# Patient Record
Sex: MALE | Race: WHITE | NOT HISPANIC OR LATINO | Employment: OTHER | URBAN - METROPOLITAN AREA
[De-identification: names, ages, dates, MRNs, and addresses within clinical notes are randomized per-mention and may not be internally consistent; named-entity substitution may affect disease eponyms.]

---

## 2021-11-16 ENCOUNTER — APPOINTMENT (OUTPATIENT)
Dept: GENERAL RADIOLOGY | Facility: HOSPITAL | Age: 36
End: 2021-11-16

## 2021-11-16 ENCOUNTER — HOSPITAL ENCOUNTER (EMERGENCY)
Facility: HOSPITAL | Age: 36
Discharge: HOME | End: 2021-11-16
Attending: EMERGENCY MEDICINE | Admitting: EMERGENCY MEDICINE

## 2021-11-16 VITALS
HEART RATE: 98 BPM | WEIGHT: 149.03 LBS | DIASTOLIC BLOOD PRESSURE: 78 MMHG | HEIGHT: 67 IN | TEMPERATURE: 97.7 F | BODY MASS INDEX: 23.39 KG/M2 | RESPIRATION RATE: 16 BRPM | SYSTOLIC BLOOD PRESSURE: 128 MMHG | OXYGEN SATURATION: 97 %

## 2021-11-16 DIAGNOSIS — S20.212A CHEST WALL CONTUSION, LEFT, INITIAL ENCOUNTER: Primary | ICD-10-CM

## 2021-11-16 PROCEDURE — 99282 EMERGENCY DEPT VISIT SF MDM: CPT

## 2021-11-16 PROCEDURE — 71101 X-RAY EXAM UNILAT RIBS/CHEST: CPT

## 2021-11-16 RX ORDER — CYCLOBENZAPRINE HCL 10 MG
10 TABLET ORAL 3 TIMES DAILY PRN
Qty: 30 TABLET | Refills: 0 | Status: SHIPPED | OUTPATIENT
Start: 2021-11-16

## 2021-11-16 RX ORDER — IBUPROFEN 600 MG/1
600 TABLET ORAL 3 TIMES DAILY
Qty: 30 TABLET | Refills: 0 | Status: SHIPPED | OUTPATIENT
Start: 2021-11-16

## 2021-11-16 NOTE — DISCHARGE INSTRUCTIONS
Take Ibuprofen and Flexeril for pain/muscle spasms. See your PCP for follow up, Return to ED for new/worsening symptoms.
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
## 2022-11-29 ENCOUNTER — OFFICE VISIT (OUTPATIENT)
Dept: NEUROSURGERY | Facility: CLINIC | Age: 37
End: 2022-11-29

## 2022-11-29 VITALS
BODY MASS INDEX: 24.37 KG/M2 | DIASTOLIC BLOOD PRESSURE: 73 MMHG | SYSTOLIC BLOOD PRESSURE: 123 MMHG | HEIGHT: 67 IN | WEIGHT: 155.3 LBS | HEART RATE: 75 BPM

## 2022-11-29 DIAGNOSIS — M47.816 LUMBAR FACET ARTHROPATHY: ICD-10-CM

## 2022-11-29 DIAGNOSIS — M51.36 DEGENERATIVE DISC DISEASE, LUMBAR: ICD-10-CM

## 2022-11-29 DIAGNOSIS — M54.42 CHRONIC MIDLINE LOW BACK PAIN WITH BILATERAL SCIATICA: Primary | ICD-10-CM

## 2022-11-29 DIAGNOSIS — M54.41 CHRONIC MIDLINE LOW BACK PAIN WITH BILATERAL SCIATICA: Primary | ICD-10-CM

## 2022-11-29 DIAGNOSIS — G89.29 CHRONIC MIDLINE LOW BACK PAIN WITH BILATERAL SCIATICA: Primary | ICD-10-CM

## 2022-11-29 PROCEDURE — 99204 OFFICE O/P NEW MOD 45 MIN: CPT | Performed by: NURSE PRACTITIONER

## 2022-11-29 RX ORDER — MELOXICAM 15 MG/1
TABLET ORAL
COMMUNITY
Start: 2022-10-04

## 2022-11-29 RX ORDER — SODIUM FLUORIDE 5 MG/G
PASTE, DENTIFRICE ORAL
COMMUNITY
Start: 2022-10-11

## 2022-11-29 NOTE — PROGRESS NOTES
"Chief Complaint  Back Pain (Low back pain that radiates bilaterally into calves)    Subjective          Bry Mcwilliams who is a 37 y.o. year old male who presents to [facility] for evaluation of lumbar spine.      The patient complains of pain located in the lumbar spine.  Patients states the pain has been present for several years.  The pain came on gradually.  He herniated two discs in 2012. Worked through the pain as he is active . Pain has progressed with time. Pain is primarily in the low back. He will have flare ups of his pain with certain activities that will cause severe pain that immobilizes him. The pain scale level is 2-4 at baseline. Can be severe at times.  The pain does radiate. Dermatomes are located bilaterally Lumbar at: into the posterior thighs and calves..  This is occasional. The pain is constant and waxing/waning and described as sharp and aching.  The pain is worse at no particular time of day. Patient states bending, twisting and lifting makes the pain worse.  Patient states rest makes the pain better.    Associated Symptoms Include: Numbness and Tingling  Conservative Interventions Include: Physical Therapy that was temporarily effective. Flexeril as needed. Also taking NSAIDs as needed. He has been evaluated by pain management, though has not had any type of injections.    Was this the result of an injury or accident?: No    History of Previous Spinal Surgery?: No    Nicotine use:  smoker  (0.5 ppd x 20 yrs)    BMI: Body mass index is 24.32 kg/m².    He scheduled    Review of Systems   Musculoskeletal: Positive for arthralgias and back pain.   Neurological: Positive for numbness.   All other systems reviewed and are negative.       Objective   Vital Signs:   /73 (BP Location: Right arm, Patient Position: Sitting, Cuff Size: Adult)   Pulse 75   Ht 170.2 cm (67\")   Wt 70.4 kg (155 lb 4.8 oz)   BMI 24.32 kg/m²   "     Physical Exam  Vitals reviewed.   Constitutional:       Appearance: Normal appearance.   Musculoskeletal:      Lumbar back: Tenderness present. Negative right straight leg raise test and negative left straight leg raise test.      Right hip: No tenderness. Normal range of motion.      Left hip: No tenderness. Normal range of motion.   Neurological:      Mental Status: He is alert and oriented to person, place, and time.      Motor: Motor strength is normal.      Gait: Gait is intact.      Deep Tendon Reflexes:      Reflex Scores:       Patellar reflexes are 2+ on the right side and 2+ on the left side.       Achilles reflexes are 2+ on the right side and 2+ on the left side.       Neurologic Exam     Mental Status   Oriented to person, place, and time.   Level of consciousness: alert    Motor Exam   Muscle bulk: normal  Overall muscle tone: normal    Strength   Strength 5/5 throughout.     Sensory Exam   Light touch normal.     Gait, Coordination, and Reflexes     Gait  Gait: normal    Reflexes   Right patellar: 2+  Left patellar: 2+  Right achilles: 2+  Left achilles: 2+  Right ankle clonus: absent  Left ankle clonus: absent       Result Review :       Data reviewed: Radiologic studies MRI Lumbar Spine at [facility] on 11/10/22 personally reviewed. Lower lumbar spine degenerative disc disease, most significant at L5/S1 where there is an assymetric disc protrusion resulting in mild right lateral recess and moderately severe foraminal stenosis with possible impingement of the S1 nerve root, moderate left foraminal stenosis. Mild spinal canal stenosis at L4/5.          Assessment and Plan    Diagnoses and all orders for this visit:    1. Chronic midline low back pain with bilateral sciatica (Primary)    2. Degenerative disc disease, lumbar    3. Lumbar facet arthropathy    Pt presenting for evaluation of low back pain with occasional sciatica. We reviewed his MRI Lumbar Spine, demonstrating degenerative  changes in the lumbar spine, most significant at L5/S1 and L4/5. His pain is predominantly axial in nature. We discussed that surgery would likely not improve his pain symptoms. Should he develop worsening leg pain he is aware to let us know.     We discussed the importance of core strengthening, avoidance of activities that worsen the pain, nicotine cessation and maintenance of healthy weight. Surgery for patients with multilevel degenerative disc disease is not typically successful with the risks outweighing the benefit.     He has been active  for over 20 years with plans to retire in March of 2023. We did discuss that the physical requirements of  service would cause progression in degenerative changes of the lumbar spine.     For his pain, he could consider lumbar facet injections and rhizotomy. He is going to consider this.     He will follow up in our office as needed.      Follow Up   No follow-ups on file.  Patient was given instructions and counseling regarding his condition or for health maintenance advice.     -Consider lumbar facet injections  -Follow up as needed